# Patient Record
Sex: MALE | Race: WHITE | URBAN - METROPOLITAN AREA
[De-identification: names, ages, dates, MRNs, and addresses within clinical notes are randomized per-mention and may not be internally consistent; named-entity substitution may affect disease eponyms.]

---

## 2019-05-20 ENCOUNTER — HOSPITAL ENCOUNTER (OUTPATIENT)
Dept: MEDSURG UNIT | Facility: HOSPITAL | Age: 34
End: 2019-05-22
Attending: SURGERY | Admitting: SURGERY

## 2019-05-20 LAB
ABS NEUT (OLG): 8.3 X10(3)/MCL (ref 2.1–9.2)
ALBUMIN SERPL-MCNC: 4.1 GM/DL (ref 3.4–5)
ALBUMIN/GLOB SERPL: 1.1 RATIO (ref 1.1–2)
ALP SERPL-CCNC: 93 UNIT/L (ref 45–117)
ALT SERPL-CCNC: 40 UNIT/L (ref 12–78)
APPEARANCE, UA: CLEAR
AST SERPL-CCNC: 15 UNIT/L (ref 15–37)
BACTERIA #/AREA URNS AUTO: ABNORMAL /[HPF]
BASOPHILS # BLD AUTO: 0.12 X10(3)/MCL
BASOPHILS NFR BLD AUTO: 1 %
BILIRUB SERPL-MCNC: 0.4 MG/DL (ref 0.2–1)
BILIRUB UR QL STRIP: NEGATIVE
BILIRUBIN DIRECT+TOT PNL SERPL-MCNC: 0.1 MG/DL
BILIRUBIN DIRECT+TOT PNL SERPL-MCNC: 0.3 MG/DL
BUN SERPL-MCNC: 22 MG/DL (ref 7–18)
CALCIUM SERPL-MCNC: 10 MG/DL (ref 8.5–10.1)
CHLORIDE SERPL-SCNC: 107 MMOL/L (ref 98–107)
CO2 SERPL-SCNC: 27 MMOL/L (ref 21–32)
COLOR UR: YELLOW
CREAT SERPL-MCNC: 1.2 MG/DL (ref 0.6–1.3)
EOSINOPHIL # BLD AUTO: 0.78 X10(3)/MCL
EOSINOPHIL NFR BLD AUTO: 5 %
ERYTHROCYTE [DISTWIDTH] IN BLOOD BY AUTOMATED COUNT: 13 % (ref 11.5–14.5)
GLOBULIN SER-MCNC: 3.7 GM/ML (ref 2.3–3.5)
GLUCOSE (UA): NORMAL
GLUCOSE SERPL-MCNC: 98 MG/DL (ref 74–106)
HCT VFR BLD AUTO: 46 % (ref 40–51)
HGB BLD-MCNC: 15.7 GM/DL (ref 13.5–17.5)
HGB UR QL STRIP: NEGATIVE
HYALINE CASTS #/AREA URNS LPF: ABNORMAL /[LPF]
IMM GRANULOCYTES # BLD AUTO: 0.04 10*3/UL
IMM GRANULOCYTES NFR BLD AUTO: 0 %
KETONES UR QL STRIP: ABNORMAL
LEUKOCYTE ESTERASE UR QL STRIP: NEGATIVE
LIPASE SERPL-CCNC: 118 UNIT/L (ref 73–393)
LYMPHOCYTES # BLD AUTO: 4.23 X10(3)/MCL
LYMPHOCYTES NFR BLD AUTO: 29 % (ref 13–40)
MCH RBC QN AUTO: 31 PG (ref 26–34)
MCHC RBC AUTO-ENTMCNC: 34.1 GM/DL (ref 31–37)
MCV RBC AUTO: 90.9 FL (ref 80–100)
MONOCYTES # BLD AUTO: 0.93 X10(3)/MCL
MONOCYTES NFR BLD AUTO: 6 % (ref 4–12)
NEUTROPHILS # BLD AUTO: 8.3 X10(3)/MCL
NEUTROPHILS NFR BLD AUTO: 58 X10(3)/MCL
NITRITE UR QL STRIP: NEGATIVE
PH UR STRIP: 5.5 [PH] (ref 4.5–8)
PLATELET # BLD AUTO: 319 X10(3)/MCL (ref 130–400)
PMV BLD AUTO: 10.8 FL (ref 7.4–10.4)
POTASSIUM SERPL-SCNC: 3.8 MMOL/L (ref 3.5–5.1)
PROT SERPL-MCNC: 7.8 GM/DL (ref 6.4–8.2)
PROT UR QL STRIP: 100 MG/DL
RBC # BLD AUTO: 5.06 X10(6)/MCL (ref 4.5–5.9)
RBC #/AREA URNS AUTO: ABNORMAL /[HPF]
SODIUM SERPL-SCNC: 141 MMOL/L (ref 136–145)
SP GR UR STRIP: 1.04 (ref 1–1.03)
SQUAMOUS #/AREA URNS LPF: ABNORMAL /[LPF]
UROBILINOGEN UR STRIP-ACNC: NORMAL
WBC # SPEC AUTO: 14.4 X10(3)/MCL (ref 4.5–11)
WBC #/AREA URNS AUTO: ABNORMAL /HPF

## 2019-05-21 LAB
ABS NEUT (OLG): 6.02 X10(3)/MCL (ref 2.1–9.2)
BASOPHILS # BLD AUTO: 0.08 X10(3)/MCL
BASOPHILS NFR BLD AUTO: 1 %
BUN SERPL-MCNC: 18 MG/DL (ref 7–18)
CALCIUM SERPL-MCNC: 9.3 MG/DL (ref 8.5–10.1)
CHLORIDE SERPL-SCNC: 110 MMOL/L (ref 98–107)
CO2 SERPL-SCNC: 26 MMOL/L (ref 21–32)
CREAT SERPL-MCNC: 0.9 MG/DL (ref 0.6–1.3)
CREAT/UREA NIT SERPL: 20
EOSINOPHIL # BLD AUTO: 0.51 X10(3)/MCL
EOSINOPHIL NFR BLD AUTO: 5 %
ERYTHROCYTE [DISTWIDTH] IN BLOOD BY AUTOMATED COUNT: 12.8 % (ref 11.5–14.5)
GLUCOSE SERPL-MCNC: 76 MG/DL (ref 74–106)
HCT VFR BLD AUTO: 43 % (ref 40–51)
HGB BLD-MCNC: 14.5 GM/DL (ref 13.5–17.5)
IMM GRANULOCYTES # BLD AUTO: 0.02 10*3/UL
IMM GRANULOCYTES NFR BLD AUTO: 0 %
LYMPHOCYTES # BLD AUTO: 3.1 X10(3)/MCL
LYMPHOCYTES NFR BLD AUTO: 30 % (ref 13–40)
MCH RBC QN AUTO: 30.6 PG (ref 26–34)
MCHC RBC AUTO-ENTMCNC: 33.7 GM/DL (ref 31–37)
MCV RBC AUTO: 90.7 FL (ref 80–100)
MONOCYTES # BLD AUTO: 0.67 X10(3)/MCL
MONOCYTES NFR BLD AUTO: 6 % (ref 4–12)
NEUTROPHILS # BLD AUTO: 6.02 X10(3)/MCL
NEUTROPHILS NFR BLD AUTO: 58 X10(3)/MCL
PLATELET # BLD AUTO: 270 X10(3)/MCL (ref 130–400)
PMV BLD AUTO: 11.1 FL (ref 7.4–10.4)
POTASSIUM SERPL-SCNC: 3.8 MMOL/L (ref 3.5–5.1)
RBC # BLD AUTO: 4.74 X10(6)/MCL (ref 4.5–5.9)
SODIUM SERPL-SCNC: 141 MMOL/L (ref 136–145)
WBC # SPEC AUTO: 10.4 X10(3)/MCL (ref 4.5–11)

## 2019-05-22 LAB
ABS NEUT (OLG): 5.44 X10(3)/MCL (ref 2.1–9.2)
BASOPHILS # BLD AUTO: 0.09 X10(3)/MCL
BASOPHILS NFR BLD AUTO: 1 %
BUN SERPL-MCNC: 14 MG/DL (ref 7–18)
CALCIUM SERPL-MCNC: 8.8 MG/DL (ref 8.5–10.1)
CHLORIDE SERPL-SCNC: 108 MMOL/L (ref 98–107)
CO2 SERPL-SCNC: 28 MMOL/L (ref 21–32)
CREAT SERPL-MCNC: 1 MG/DL (ref 0.6–1.3)
CREAT/UREA NIT SERPL: 14
EOSINOPHIL # BLD AUTO: 0.55 10*3/UL
EOSINOPHIL NFR BLD AUTO: 5 %
ERYTHROCYTE [DISTWIDTH] IN BLOOD BY AUTOMATED COUNT: 12.7 % (ref 11.5–14.5)
GLUCOSE SERPL-MCNC: 80 MG/DL (ref 74–106)
HCT VFR BLD AUTO: 43.9 % (ref 40–51)
HGB BLD-MCNC: 14.5 GM/DL (ref 13.5–17.5)
IMM GRANULOCYTES # BLD AUTO: 0.02 10*3/UL
IMM GRANULOCYTES NFR BLD AUTO: 0 %
LYMPHOCYTES # BLD AUTO: 3.52 X10(3)/MCL
LYMPHOCYTES NFR BLD AUTO: 35 % (ref 13–40)
MCH RBC QN AUTO: 30.3 PG (ref 26–34)
MCHC RBC AUTO-ENTMCNC: 33 GM/DL (ref 31–37)
MCV RBC AUTO: 91.6 FL (ref 80–100)
MONOCYTES # BLD AUTO: 0.48 X10(3)/MCL
MONOCYTES NFR BLD AUTO: 5 % (ref 4–12)
NEUTROPHILS # BLD AUTO: 5.44 X10(3)/MCL
NEUTROPHILS NFR BLD AUTO: 54 X10(3)/MCL
PLATELET # BLD AUTO: 273 X10(3)/MCL (ref 130–400)
PMV BLD AUTO: 11.3 FL (ref 7.4–10.4)
POTASSIUM SERPL-SCNC: 3.8 MMOL/L (ref 3.5–5.1)
RBC # BLD AUTO: 4.79 X10(6)/MCL (ref 4.5–5.9)
SODIUM SERPL-SCNC: 140 MMOL/L (ref 136–145)
WBC # SPEC AUTO: 10.1 X10(3)/MCL (ref 4.5–11)

## 2019-07-18 ENCOUNTER — HISTORICAL (OUTPATIENT)
Dept: WOUND CARE | Facility: HOSPITAL | Age: 34
End: 2019-07-18

## 2019-07-18 ENCOUNTER — HISTORICAL (OUTPATIENT)
Dept: ADMINISTRATIVE | Facility: HOSPITAL | Age: 34
End: 2019-07-18

## 2019-07-18 LAB
ALBUMIN SERPL-MCNC: 3.9 GM/DL (ref 3.4–5)
ALBUMIN/GLOB SERPL: 1.1 RATIO (ref 1.1–2)
ALP SERPL-CCNC: 122 UNIT/L (ref 45–117)
ALT SERPL-CCNC: 60 UNIT/L (ref 12–78)
AST SERPL-CCNC: 21 UNIT/L (ref 15–37)
BILIRUB SERPL-MCNC: 0.8 MG/DL (ref 0.2–1)
BILIRUBIN DIRECT+TOT PNL SERPL-MCNC: 0.3 MG/DL
BILIRUBIN DIRECT+TOT PNL SERPL-MCNC: 0.5 MG/DL
BUN SERPL-MCNC: 17 MG/DL (ref 7–18)
CALCIUM SERPL-MCNC: 9.6 MG/DL (ref 8.5–10.1)
CHLORIDE SERPL-SCNC: 108 MMOL/L (ref 98–107)
CO2 SERPL-SCNC: 29 MMOL/L (ref 21–32)
CREAT SERPL-MCNC: 1 MG/DL (ref 0.6–1.3)
GLOBULIN SER-MCNC: 3.5 GM/ML (ref 2.3–3.5)
GLUCOSE SERPL-MCNC: 111 MG/DL (ref 74–106)
POTASSIUM SERPL-SCNC: 3.5 MMOL/L (ref 3.5–5.1)
PROT SERPL-MCNC: 7.4 GM/DL (ref 6.4–8.2)
SODIUM SERPL-SCNC: 142 MMOL/L (ref 136–145)

## 2022-04-11 ENCOUNTER — HISTORICAL (OUTPATIENT)
Dept: ADMINISTRATIVE | Facility: HOSPITAL | Age: 37
End: 2022-04-11

## 2022-04-24 VITALS
WEIGHT: 291.44 LBS | BODY MASS INDEX: 39.47 KG/M2 | OXYGEN SATURATION: 96 % | SYSTOLIC BLOOD PRESSURE: 146 MMHG | DIASTOLIC BLOOD PRESSURE: 74 MMHG | HEIGHT: 72 IN

## 2022-04-30 NOTE — ED PROVIDER NOTES
Patient:   Tim Lenz            MRN: [id number]            FIN: [id number]               Age:   34 years     Sex:  Male     :  1985   Associated Diagnoses:   Cholelithiasis   Author:   Shady Palma MD      Basic Information   Additional information: Chief Complaint from Nursing Triage Note : Chief Complaint   2019 16:32 CDT      Chief Complaint           upper abdominal pain, hx gall bladder problems,  this episode started around 1500 today, took pain meds prescribed without relief  .      History of Present Illness   The patient presents with abdominal pain.  The onset was 2  hours ago.  The course/duration of symptoms is constant.  The character of symptoms is sharp.  The degree at onset was moderate.  The Location of pain at onset was right, upper and abdominal.  The degree at present is moderate.  The Location of pain at present is right, upper and abdominal.  Radiating pain: none. The exacerbating factor is none.  The relieving factor is none.  Therapy today: none.  Risk factors consist of none.  Associated symptoms: nausea.        Review of Systems   Constitutional symptoms:  No fever, no chills, no sweats.    Skin symptoms:  No jaundice, no rash.    Eye symptoms:  Negative except as documented in HPI.   ENMT symptoms:  No sore throat,    Respiratory symptoms:  No shortness of breath, no cough, no wheezing.    Cardiovascular symptoms:  No chest pain, no palpitations, no tachycardia.    Gastrointestinal symptoms:  Abdominal pain, moderate, right upper quadrant, achy, no nausea, no vomiting, no diarrhea, no constipation.    Genitourinary symptoms:  No dysuria,    Musculoskeletal symptoms:  No back pain,    Neurologic symptoms:  No headache, no dizziness.    Psychiatric symptoms:  No anxiety, no depression, no substance abuse.    Allergy/immunologic symptoms:  No seasonal allergies,              Additional review of systems information: All other systems reviewed and otherwise  negative.      Health Status   Allergies:    Allergic Reactions (Selected)  Severity Not Documented  Cats- Rash, itching, watery eyes and wheezing.  Grass- Rash and itching.,    Allergies (2) Active Reaction  Cats Itching  Grass Rash.   Medications:  (Selected)   Prescriptions  Prescribed  Zofran ODT 4 mg oral tablet, disintegratin mg = 1 tab(s), Oral, q6hr, PRN PRN as needed for nausea/vomiting, # 20 tab(s), 0 Refill(s)  dicyclomine 20 mg oral tablet: 20 mg = 1 tab(s), Oral, QID, PRN PRN for abdominal pain, # 28 tab(s), 0 Refill(s)  omeprazole 20 mg oral DR capsule: 20 mg = 1 cap(s), Oral, BID, # 60 cap(s), 0 Refill(s), per nurse's notes.      Past Medical/ Family/ Social History   Medical history:    Active  Cholelithiasis (SNOMED CT 403444607): Onset on 2019 at 34 years.  HTN (hypertension) (SNOMED CT 0016OQ5A-[id number])  Depression (SNOMED CT 799014242)  ADD (attention deficit disorder) (SNOMED CT [id number]-[id number]), Reviewed as documented in chart.   Surgical history:    SIJ - Sacroiliac joint ([id number]).  Lower back (21970361).  Right shoulder (200001533)., Reviewed as documented in chart.   Family history:    Diabetes mellitus type 2  Father  Mother  Stroke  Father  Primary malignant neoplasm of brain  Father  Hypertension.  Father  , Reviewed as documented in chart.   Social history:    Social & Psychosocial Habits    Alcohol  2017  Use: Current    Frequency: 1-2 times per year    Home/Environment  2017  Lives with: Significant other    Home equipment: Walker/Cane    Substance Abuse  2017  Use: Never    Tobacco  2017  Use: Current every day smoker    Type: Cigarettes    Tobacco use per day: 15    2019  Use: 10 or more cigarettes (1/    Patient Wants Consult For Cessation Counseling No    2019  Use: 10 or more cigarettes (1/    Patient Wants Consult For Cessation Counseling N/A, Reviewed as documented in chart.   Problem list:     Active Problems (6)  ADD (attention deficit disorder)   Cholelithiasis   Depression   HTN (hypertension)   Obesity   Tobacco user , per nurse's notes.      Physical Examination               Vital Signs   Vital Signs   5/20/2019 16:32 CDT      Temperature Oral          36.4 DegC                             Temperature Oral (calculated)             97.52 DegF                             Peripheral Pulse Rate     73 bpm                             Respiratory Rate          20 br/min                             SpO2                      95 %                             Oxygen Therapy            Room air                             Systolic Blood Pressure   150 mmHg  HI                             Diastolic Blood Pressure  102 mmHg  HI  .   Measurements   5/20/2019 16:32 CDT      Weight Dosing             130 kg                             Weight Measured           130 kg                             Weight Measured and Calculated in Lbs     286.60 lb                             Height/Length Dosing      182 cm                             Height/Length Estimated   182 cm  .   Basic Oxygen Information   5/20/2019 16:32 CDT      SpO2                      95 %                             Oxygen Therapy            Room air  .   General:  Alert, no acute distress.    Skin:  Warm, dry.    Head:  Normocephalic.   Eye:  Extraocular movements are intact.   Cardiovascular:  Regular rate and rhythm.   Respiratory:  Lungs are clear to auscultation.   Back:  Normal range of motion.   Musculoskeletal:  Normal ROM.   Chest wall   Gastrointestinal:  Soft, Tenderness: Moderate, right upper quadrant, Guarding: Negative, Rebound: Negative, Bowel sounds: Normal.    Neurological:  Alert and oriented to person, place, time, and situation, normal motor observed, normal speech observed.    Lymphatics   Psychiatric:  Cooperative, appropriate mood & affect.       Medical Decision Making   Documents reviewed:  Emergency department nurses'  notes.   Orders  Launch Orders   Pharmacy:  Toradol (Order): 30 mg, form: Injection, IM, Once, first dose 5/20/2019 17:38 CDT, stop date 5/20/2019 17:38 CDT, STAT, launch Order Profile (Selected)   Inpatient Orders  Ordered (In-Lab)  CMP: Stat collect, 05/20/19 16:39:00 CDT, Blood, Stop date 05/20/19 16:39:00 CDT, Lab Collect, 05/20/19 16:39:00 CDT  Lipase Level: Stat collect, 05/20/19 16:39:00 CDT, Blood, Stop date 05/20/19 16:39:00 CDT, Lab Collect, 05/20/19 16:39:00 CDT  Completed  Automated Diff: Stat collect, 05/20/19 16:50:00 CDT, Blood, Collected, Stop date 05/20/19 16:50:00 CDT, Lab Collect, 05/20/19 16:39:00 CDT  CBC w/ Auto Diff: Stat collect, 05/20/19 16:39:00 CDT, Blood, Stop date 05/20/19 16:39:00 CDT, Lab Collect, 05/20/19 16:39:00 CDT.    Results review:  Lab results : Lab View   5/20/2019 16:50 CDT      Sodium Lvl                141 mmol/L                             Potassium Lvl             3.8 mmol/L                             Chloride                  107 mmol/L                             CO2                       27 mmol/L                             Calcium Lvl               10.0 mg/dL                             Glucose Lvl               98 mg/dL                             BUN                       22 mg/dL  HI                             Creatinine                1.20 mg/dL                             eGFR-AA                   90 mL/min                             eGFR-CELINA                  74 mL/min  LOW                             Bili Total                0.4 mg/dL                             Bili Direct               0.1 mg/dL                             Bili Indirect             0.3 mg/dL                             AST                       15 unit/L                             ALT                       40 unit/L                             Alk Phos                  93 unit/L                             Total Protein             7.8 gm/dL                             Albumin Lvl                4.1 gm/dL                             Globulin                  3.70 gm/mL  HI                             A/G Ratio                 1.1 ratio                             Lipase Lvl                118 unit/L                             WBC                       14.4 x10(3)/mcL  HI                             RBC                       5.06 x10(6)/mcL                             Hgb                       15.7 gm/dL                             Hct                       46.0 %                             Platelet                  319 x10(3)/mcL                             MCV                       90.9 fL                             MCH                       31.0 pg                             MCHC                      34.1 gm/dL                             RDW                       13.0 %                             MPV                       10.8 fL  HI                             Abs Neut                  8.30 x10(3)/mcL                             Neutro Auto               58 x10(3)/mcL  NA                             Lymph Auto                29 %                             Mono Auto                 6 %                             Eos Auto                  5  NA                             Abs Eos                   0.78 x10(3)/mcL  NA                             Basophil Auto             1  NA                             Abs Neutro                8.30 x10(3)/mcL  NA                             Abs Lymph                 4.23 x10(3)/mcL  NA                             Abs Mono                  0.93 x10(3)/mcL  NA                             Abs Baso                  0.12 x10(3)/mcL  NA                             IG%                       0 %  NA                             IG#                       0.0400  NA  ,    No qualifying data available.       Reexamination/ Reevaluation   Time: 5/20/2019 18:04:00 .   Interventions: PowerOrders   Pharmacy:  Demerol HCl 25 mg/mL injectable solution (Order): 25 mg, form:  Injection, IV, Once-NOW, first dose 5/20/2019 18:04 CDT, stop date 5/20/2019 18:04 CDT, STAT.   Notes: Patient is continuing to have right upper quadrant abdominal pain, is known to have cholelithiasis, last time his gallbladder wall was normal, common bile duct normal, his white count is 15,000, his LFTs and total bilirubin is normal, I am going to call surgery since this is his 3rd visit to the emergency room with right upper quadrant pain and this time, the pain is constant..      Impression and Plan   Diagnosis   Cholelithiasis ([id number] K80.20)      Calls-Consults   -  5/20/2019 18:05:00 , Pérez FERNANDEZ, Francisca.    Plan   Disposition: Admit time  5/20/2019 18:08:00, Admit to Surgery.    Counseled: Patient.

## 2022-05-05 NOTE — HISTORICAL [facility] CERNER
This is a historical note converted from [employer]. Formatting and pictures may have been removed.  Please reference [employer] for original formatting and attached multimedia. Indication for Surgery  34yoM with acute cholecystitis. Risks, benefits, alternatives to laparoscopic cholecystectomy was discussed with the patient and he elected to proceed with surgery.  Preoperative Diagnosis  acute cholecystitis  Postoperative Diagnosis  same  Operation  laparoscopic cholecystectomy  Surgeon(s)  MD Mundo Cottrell MD  Assistant  Rafat Quiroz, MS-3  Anesthesia  GET  Estimated Blood Loss  9ml  Findings  acute cholecystitis  Specimen(s)  gallbladder  Complications  none  Technique  The patient was taken to the operative suite and placed in the supine position. He underwent induction of general anesthesia with endotracheal intubation without issue. He was prepped and draped in the usual sterile fashion and a timeout was called. Supraumbilical incision was made and a 5mm optical trocar was placed in the standard fashion without issue. The abdomen was insufflated to 15 mmHg and the scope was inserted. No injury from trocar placement was noted. The patient was placed in reverse trendelenberg with right side up and 2 more 5mm ports were placed in RUQ and a 12mm port was placed subxiphoid. The gallbladder was grasped and retracted cephalad over the liver edge. Dissection proceeded at the infundibulum of the gallbadder. Omental adhesions were taken down with electrocautery. The gallbladder was noted to be inflamed, edematous with large stones impacted at the neck. Dissection proceeded. The peritoneum and fat were dissected away revealed the cystic duct and artery. Both were circumferentially dissected from surrounding tissue to reveal the critical view of safety. The cystic duct was noted to be too large for the 10mm clip and impacted with stones. It was transected distally, away from the CBD. Large stones were milked  out and the cystic duct was ligated with a 0 PDS endoloop. The cystic artery was clipped and cut. The gallbladder was removed from the liver bed with electrocautery. The gallbadder and stones were removed from the abdomen in an endocatch bag. The RUQ was irrigated and suctioned dry. The liver bed, endoloop, and clip were inspected. Hemostasis was noted. The instruments and trocars were removed. The abdomen was desufflated. The fascia at the subxiphoid incision was closed with 0 Vicryl. .25% marcaine was injected at all incisions. Skin was closed with 4-0 PDS subcuticular. The patient tolerated the procedure well.   This certifies I was present during the entire period between opening and closing of the surgical field.

## 2022-05-05 NOTE — HISTORICAL [facility] CERNER
This is a historical note converted from [employer]. Formatting and pictures may have been removed.  Please reference [employer] for original formatting and attached multimedia. Admit and Discharge Dates  Admit Date: 05/20/2019  Discharge Date: 05/22/2019  ?  Physicians  Attending Physician - Annika FERNANDEZ, Paul CLEMENT  Admitting Physician - Annika FERNANDEZ, Paul CLEMENT  Consulting Physician - Tramaine FERNANDEZ, Mundo PEREZ  Primary Care Physician - No PCP, No  ?  Discharge Diagnosis  Abdominal pain?[id number]  Acute cholecystitis?K81.0  Cholelithiasis?K80.20  Surgical Procedures  05/22/2019 - [id number] - Lap Vesta  ?  Immunizations  No immunizations recorded for this visit.  ?  Admission Information  Mr. Lenz is a 34 year old male with a known history of cholelithiasis who presented to the ED on 5/20 with RUQ abdominal pain after eating a cheesy omelet. The patient was found to have a leukocytosis. He was admitted and started on antibiotics. After discussing the risks, benefits, and alternatives of laparoscopic cholecystectomy, the patient was taken to the OR on 5/22. He tolerated the procedure well and was brought to the PACU to recover. The patient was discharged after tolerating PO, pain was under control.  Significant Findings  Acute cholecystitis  Time Spent on discharge  20 min  Objective  Vitals & Measurements  T:?36.8? ?C (Oral)? TMIN:?36.3? ?C (Temporal Artery)? TMAX:?36.8? ?C (Oral)? HR:?76(Monitored)? RR:?20? BP:?127/86? SpO2:?99%?  Physical Exam  General: alert, oriented, NAD  HEENT: NCAT, EOMI  Neck: Normal ROM  Resp: No increased WOB  CV: RRR  Abd: Soft, reza-incisional tenderness  MSK: normal ROM, no abnormalities  Skin: warm, dry  Psych: normal mood and affect  Patient Discharge Condition  Stable  Discharge Disposition  Home   Discharge Medication Reconciliation  Prescribed  acetaminophen-HYDROcodone (acetaminophen-hydrocodone 325 mg-5 mg oral tablet)?1 tab(s), Oral, q6hr, PRN pain,  moderate  ondansetron (ondansetron 4 mg oral tablet, disintegrating)?4 mg, Oral, q8hr  Continue  dicyclomine (dicyclomine 20 mg oral tablet)?20 mg, Oral, QID, PRN for abdominal pain  omeprazole (omeprazole 20 mg oral DR capsule)?20 mg, Oral, BID  ondansetron (Zofran ODT 4 mg oral tablet, disintegrating)?4 mg, Oral, q6hr, PRN as needed for nausea/vomiting  ?  Education and Orders Provided  Cholecystitis, Easy-to-Read  Laparoscopic Cholecystectomy  Laparoscopic Cholecystectomy, Care After  Lap mimi Low-Fat Diet for Pancreatitis or Gallbladder Conditions (Custom)  Hypertension, Easy-to-Read  Discharge Diet - Low Fat?  Discharge - 05/22/19 13:04:00 CDT, Home, Give all scheduled vaccinations prior to discharge.?  Discharge Activity - No Heavy Lifting, No heavy lifting 2 weeks?  ?  Follow up  [facility], on 06/06/2019  ?